# Patient Record
Sex: MALE | URBAN - METROPOLITAN AREA
[De-identification: names, ages, dates, MRNs, and addresses within clinical notes are randomized per-mention and may not be internally consistent; named-entity substitution may affect disease eponyms.]

---

## 2019-10-02 ENCOUNTER — HOSPITAL ENCOUNTER (OUTPATIENT)
Dept: TELEMEDICINE | Facility: HOSPITAL | Age: 79
Discharge: HOME | End: 2019-10-02

## 2019-10-02 PROCEDURE — 99499 NO LOS: ICD-10-PCS | Mod: GT,,, | Performed by: PSYCHIATRY & NEUROLOGY

## 2019-10-02 PROCEDURE — 99499 UNLISTED E&M SERVICE: CPT | Mod: GT,,, | Performed by: PSYCHIATRY & NEUROLOGY

## 2019-10-02 NOTE — HPI
79M woke @ 3 am with R UE numbness. The aforementioned symptoms have never happened before. There are no identified triggers or modifying factors. There have been no recurrent events. There are no other associated symptoms.

## 2019-10-02 NOTE — CONSULTS
"Multiple attempts to reach cart, not available. Called while on audio line with ED multiple times, unable to answer still.  Discussed R UE limb in isolation with drift, also c/o "heat waves" in his right eye.  Given the eye finding this makes whatever complaint he has less likely to be peripheral nerve injury.  Out of window for alteplase as wake up.  Plan for MRI in AM to definitively r/o central cause/stroke.   x1 in the meantime.  If MRI negative, recommend outpatient neurology evaluation.  "